# Patient Record
Sex: FEMALE | Race: WHITE | URBAN - METROPOLITAN AREA
[De-identification: names, ages, dates, MRNs, and addresses within clinical notes are randomized per-mention and may not be internally consistent; named-entity substitution may affect disease eponyms.]

---

## 2018-02-09 ENCOUNTER — HOSPITAL ENCOUNTER (OUTPATIENT)
Dept: PHYSICAL THERAPY | Age: 43
Discharge: HOME | End: 2018-02-09
Payer: COMMERCIAL

## 2018-02-09 PROCEDURE — 97161 PT EVAL LOW COMPLEX 20 MIN: CPT

## 2018-02-09 NOTE — PROGRESS NOTES
Ambulatory/Rehab Services H2 Model Falls Risk Assessment    Risk Factor Pts. ·   Confusion/Disorientation/Impulsivity  []    4 ·   Symptomatic Depression  []   2 ·   Altered Elimination  []   1 ·   Dizziness/Vertigo  []   1 ·   Gender (Male)  []   1 ·   Any administered antiepileptics (anticonvulsants):  []   2 ·   Any administered benzodiazepines:  []   1 ·   Visual Impairment (specify):  []   1 ·   Portable Oxygen Use  []   1 ·   Orthostatic ? BP  []   1 ·   History of Recent Falls (within 3 mos.)  []   5     Ability to Rise from Chair (choose one) Pts. ·   Ability to rise in a single movement  [x]   0 ·   Pushes up, successful in one attempt  []   1 ·   Multiple attempts, but successful  []   3 ·   Unable to rise without assistance  []   4   Total: (5 or greater = High Risk) 0     Falls Prevention Plan:   []                Physical Limitations to Exercise (specify):   []                Mobility Assistance Device (type):   []                Exercise/Equipment Adaptation (specify):    ©2010 [facility] [address] Patent #7568,600.  Federal Law prohibits the replication, distribution or use without written permission from [facility] [employer]

## 2018-02-09 NOTE — THERAPY EVALUATION
Aldona Closs  : 1975  Primary: Elise Yuan Of [facility]*  Secondary:  [facility]rapy Center at [facility], [address] [address], [address]  [phone]   [phone]          OUTPATIENT PHYSICAL THERAPY:Initial Assessment 2018    ICD-10: Treatment Diagnosis: Stress Incontinence (N39.3)   Precautions/Allergies:   Review of patient's allergies indicates no known allergies. Fall Risk Score: 0 (? 5 = High Risk)  MD Orders: Evaluate and treat MEDICAL/REFERRING DIAGNOSIS:  Stress incontinence [N39.3]   DATE OF ONSET:   REFERRING PHYSICIAN: Matty Kaur MD  RETURN PHYSICIAN APPOINTMENT: unknown     INITIAL ASSESSMENT:  Ms. Humza Roberts presents with significant weakness, poor endurance, and decreased coordination of PFmm likely contributing to her stress incontinence. She also demonstrates mild laxity of anterior vaginal wall, which may also be a possible contributor. She exhibited inability to isolate PFmm without accessory abdominal use, however this improved by end of session. Pt will benefit from physical therapy to address stated problems. Plan of care was discussed and agreed upon with patient and HEP was initiated. Thank you for the opportunity to work with this patient. PROBLEM LIST (Impacting functional limitations):  1. Decreased strength of pelvic floor which limits bladder control INTERVENTIONS PLANNED:  1. Neuromuscular re-education  2. Biofeedback as needed  3. HEP  4. Bladder retraining  5. Bladder education  6. Manual Therapy  7. Therapeutic Activites  8. Therapeutic Exercise/Strengthening   TREATMENT PLAN:  Effective Dates: 18 TO 18. Frequency/Duration: 1 time a week for 12 weeks  GOALS: (Goals have been discussed and agreed upon with patient.)  Short-Term Functional Goals: Time Frame: 4 weeks  1.  Patient will demonstrate independence with home exercise program.  2. Pt will demonstrate good isolation of PFmm during kegel for efficient strengthening without additional pressure on bladder. Discharge Goals: Time Frame: 12 weeks  1. Pt will score 7% on PFIQ7 for overall functional improvement. 2. Pt will demonstrate improvement in PFmm strength to score 3/5 on MMT and hold 10sec. 3. Pt will report ability to perform daily activities without use of panty liner for improved quality of life. 4. Pt will report ability to perform kickboxing class and run with only panty liner for decreased social anxiety. Rehabilitation Potential For Stated Goals: Good  Regarding Gretchen Humerickhouse's therapy, I certify that the treatment plan above will be carried out by a therapist or under their direction. Thank you for this referral,  Andressa Baxter, PT     Referring Physician Signature: Tono Dasilva MD              Date                    The information in this section was collected on 02/09/18  (except where otherwise noted). HISTORY:   History of Present Injury/Illness (Reason for Referral):  2005 after 2nd child getting progressively worse more consistent since 2007.  2 vaginal births - epiosiotomy with 1st large grade. Difficulty exercising etc.. Past Medical History/Comorbidities:   Ms. Jefferson Mendoza  has no past medical history on file. Ms. Jefferson Mendoza  has no past surgical history on file. Social History/Living Environment:     enjoys kickboxing and running. If running needs to wear size 2-3 pad  Prior Level of Function/Work/Activity:  . Previous Treatment Approaches:          none  Personal Factors:          Sex:  female        Age:  38 y.o. Current Medications:  No current outpatient prescriptions on file. Date Last Reviewed:  02/09/18   Voiding Patterns:  Patient voids every few hours during the day and 0 times during the night. Patient reports that she empties bladder fully. Patient uses pads for bladder protection; she changes pads 1 times per day.   Fluid Intake:  Patient drinks 36-60oz of water per day. She consumes 2 cups of caffeinated beverages per day. Patient does not limit fluid intake to control bladder. Bowel Habits:  Patient demonstrates normal bowel habits. Personal / Social History:  · Sexually active? YES:       Number of Personal Factors/Comorbidities that affect the Plan of Care: 1-2: MODERATE COMPLEXITY   EXAMINATION:   External Observation:   · Voluntary Contraction:not present  · Voluntary Relaxation: not present  · Involuntary Contraction: not present  · Involuntary Relaxation: not present  · Perineal Body Assessment: decreased size. Some evidence of prior tearing from childbirth  · Skin Integrity: normal  · Q-tip Test: no tenderness  · Vaginal Vault Size: within normal limits    Lacock PERFECT:  Via: vaginal canal        Strength: P: Power, E: Endurance, R: Repetitions, QF: Quick Flicks, TrA: Transverse Abdominus, T: Timing, DB: Diaphragmatic Breathing  P trace   E 2sec   R 6   QF 6   TrA normal   T    DB normal                 Palpation:   Right Left   Bulbocavernosus     Ischocavernosus     Superficial Transverse Perineal     Sphincter Urethrae     Compressor Urethra      Urethra-vaginalis     Deep Transverse Perineium     Obturator Internus     Iliococcygeus     Coccygeus     Pubococcygeus     Levator Ani     Adductor     Psoas Mildly tender Mildly tender   Ant. Abdominal wall             Prolapse:  · Tissue support test with bearing down (Grade 1: not visible at introitus; Grade 2: visible at introitus; Grade 3: tissue outside introitus):  · Anterior Wall = 2   · Posterior Wall = 1   · Apical = 1 but able to feel descent with bearing down       Body Structures Involved:  1. Nerves  2. Joints  3. Muscles  4. Ligaments Body Functions Affected:  1. Mental  2. Sensory/Pain  3. Genitourinary  4. Neuromusculoskeletal  5. Movement Related  6. Skin Related Activities and Participation Affected:  1. Learning and Applying Knowledge  2.  General Tasks and Demands  3. Mobility  4. Self Care  5. Interpersonal Interactions and Relationships   Number of elements that affect the Plan of Care: 1-2: LOW COMPLEXITY   CLINICAL PRESENTATION:   Presentation: Stable and uncomplicated: LOW COMPLEXITY   CLINICAL DECISION MAKING:   Outcome Measure: Tool Used: Pelvic Floor Impact Questionnaireshort form 7 (PFIQ-7)   Score:  Initial: 14.29%  · Bladder or Urine: 42.86  · Bowel or Rectum: 0  · Vagina or Pelvis: 0 Most Recent: X (Date: -- )  · Bladder or Urine: X  · Bowel or Rectum: X  · Vagina or Pelvis: X   Interpretation of Score: Each of the 7 sections is scored on a scale from 0-3; 0 representing \"Not at all\", 3 representing \"Quite a bit\". The mean value is taken from all the answered items, then multiplied by 100 to obtain the scale score, ranging from 0-100. This process is repeated for each column representing bowel, bladder, and pelvic pain. ? Self Care:     - CURRENT STATUS: CI - 1%-19% impaired, limited or restricted    - GOAL STATUS: CI - 1%-19% impaired, limited or restricted    - D/C STATUS:  ---------------To be determined---------------     Medical Necessity:   · Patient demonstrates good rehab potential due to higher previous functional level. Reason for Services/Other Comments:  · Patient continues to require skilled intervention due to ongoing goals noted above. Use of outcome tool(s) and clinical judgement create a POC that gives a: Clear prediction of patient's progress: LOW COMPLEXITY   TREATMENT:   (In addition to Assessment/Re-Assessment sessions the following treatments were rendered)  Pre-treatment Symptoms/Complaints:  See above  Pain: Initial:   Pain Intensity 1: 0  Post Session:  0     THERAPEUTIC EXERCISE: (  minutes):  Exercises per grid below to improve strength and coordination. Required minimal verbal and tactile cues to promote proper body mechanics and promote proper body breathing techniques.   Progressed resistance and repetitions as indicated. Date:   Date:   Date:     Activity/Exercise Parameters Parameters Parameters                                                (Used abbreviations: MET - muscle energy technique; SCS- Strain counter strain; CTM- Connective tissue mobilizations; CR- Contract/relax; SP- Sustained pressure, TrP- Trigger point release, IASTM- Instrument assisted soft tissue mobilizations, TDN- Trigger point dry needling). HEP Exercises:  Patient instructed in pelvic floor exercises listed below:  kegels 2-3sec hold 10x TID    [employer] Portal    The following educational topics were reviewed with patient:  Bladder health, tips to control urge, bladder diary, pelvic floor anatomy, how foods affect bladder, bladder retraining. Treatment/Session Assessment:  Pt reported good understanding of plan of care as well as exercises. All questions were answered and pt was invited to call with any further questions or issues  · Response to Treatment:  Good   · Compliance with Program/Exercises: Will assess as treatment progresses. · Recommendations/Intent for next treatment session: \"Next visit will focus on advancements to more challenging activities\".   Total Treatment Duration:  PT Patient Time In/Time Out  Time In: 1315  Time Out: Linda 7, PT

## 2018-02-21 ENCOUNTER — HOSPITAL ENCOUNTER (OUTPATIENT)
Dept: PHYSICAL THERAPY | Age: 43
Discharge: HOME | End: 2018-02-21
Payer: COMMERCIAL

## 2018-02-21 PROCEDURE — 97110 THERAPEUTIC EXERCISES: CPT

## 2018-02-21 NOTE — PROGRESS NOTES
Jt Rhoades  : 1975  Primary: Martell Sher Of [facility]*  Secondary:  Therapy Center at [facility], [address] [facility],[address], [address]  [phone]   [phone]          OUTPATIENT PHYSICAL THERAPY:Daily Note 2018    ICD-10: Treatment Diagnosis: Stress Incontinence (N39.3)   Precautions/Allergies:   Review of patient's allergies indicates no known allergies. Fall Risk Score: 0 (? 5 = High Risk)  MD Orders: Evaluate and treat MEDICAL/REFERRING DIAGNOSIS:  Stress incontinence [N39.3]   DATE OF ONSET:   REFERRING PHYSICIAN: Yvette Wetzel MD  RETURN PHYSICIAN APPOINTMENT: unknown     INITIAL ASSESSMENT:  Ms. Anne Saavedra presents with significant weakness, poor endurance, and decreased coordination of PFmm likely contributing to her stress incontinence. She also demonstrates mild laxity of anterior vaginal wall, which may also be a possible contributor. She exhibited inability to isolate PFmm without accessory abdominal use, however this improved by end of session. Pt will benefit from physical therapy to address stated problems. Plan of care was discussed and agreed upon with patient and HEP was initiated. Thank you for the opportunity to work with this patient. PROBLEM LIST (Impacting functional limitations):  1. Decreased strength of pelvic floor which limits bladder control INTERVENTIONS PLANNED:  1. Neuromuscular re-education  2. Biofeedback as needed  3. HEP  4. Bladder retraining  5. Bladder education  6. Manual Therapy  7. Therapeutic Activites  8. Therapeutic Exercise/Strengthening   TREATMENT PLAN:  Effective Dates: 18 TO 18. Frequency/Duration: 1 time a week for 12 weeks  GOALS: (Goals have been discussed and agreed upon with patient.)  Short-Term Functional Goals: Time Frame: 4 weeks  1.  Patient will demonstrate independence with home exercise program.  2. Pt will demonstrate good isolation of PFmm during kegel for efficient strengthening without additional pressure on bladder. Discharge Goals: Time Frame: 12 weeks  1. Pt will score 7% on PFIQ7 for overall functional improvement. 2. Pt will demonstrate improvement in PFmm strength to score 3/5 on MMT and hold 10sec. 3. Pt will report ability to perform daily activities without use of panty liner for improved quality of life. 4. Pt will report ability to perform kickboxing class and run with only panty liner for decreased social anxiety. Rehabilitation Potential For Stated Goals: Good  Regarding Gretchen Humerickhouse's therapy, I certify that the treatment plan above will be carried out by a therapist or under their direction. Thank you for this referral,  Oscar Crespo PT     Referring Physician Signature: Yancy Pena MD              Date                    The information in this section was collected on 02/21/18  (except where otherwise noted). HISTORY:   History of Present Injury/Illness (Reason for Referral):  2005 after 2nd child getting progressively worse more consistent since 2007.  2 vaginal births - epiosiotomy with 1st large grade. Difficulty exercising etc.. Past Medical History/Comorbidities:   Ms. Roque Haider  has no past medical history on file. Ms. Roque Haider  has no past surgical history on file. Social History/Living Environment:     enjoys kickboxing and running. If running needs to wear size 2-3 pad  Prior Level of Function/Work/Activity:  . Previous Treatment Approaches:          none  Personal Factors:          Sex:  female        Age:  38 y.o. Current Medications:  No current outpatient prescriptions on file. Date Last Reviewed:  02/21/18   Voiding Patterns:  Patient voids every few hours during the day and 0 times during the night. Patient reports that she empties bladder fully. Patient uses pads for bladder protection; she changes pads 1 times per day.   Fluid Intake:  Patient drinks 36-60oz of water per day. She consumes 2 cups of caffeinated beverages per day. Patient does not limit fluid intake to control bladder. Bowel Habits:  Patient demonstrates normal bowel habits. Personal / Social History:  · Sexually active? YES:       Number of Personal Factors/Comorbidities that affect the Plan of Care: 1-2: MODERATE COMPLEXITY   EXAMINATION:   External Observation:   · Voluntary Contraction:not present  · Voluntary Relaxation: not present  · Involuntary Contraction: not present  · Involuntary Relaxation: not present  · Perineal Body Assessment: decreased size. Some evidence of prior tearing from childbirth  · Skin Integrity: normal  · Q-tip Test: no tenderness  · Vaginal Vault Size: within normal limits    Lacock PERFECT:  Via: vaginal canal        Strength: P: Power, E: Endurance, R: Repetitions, QF: Quick Flicks, TrA: Transverse Abdominus, T: Timing, DB: Diaphragmatic Breathing  P trace   E 2sec   R 6   QF 6   TrA normal   T    DB normal                 Palpation:   Right Left   Bulbocavernosus     Ischocavernosus     Superficial Transverse Perineal     Sphincter Urethrae     Compressor Urethra      Urethra-vaginalis     Deep Transverse Perineium     Obturator Internus     Iliococcygeus     Coccygeus     Pubococcygeus     Levator Ani     Adductor     Psoas Mildly tender Mildly tender   Ant. Abdominal wall             Prolapse:  · Tissue support test with bearing down (Grade 1: not visible at introitus; Grade 2: visible at introitus; Grade 3: tissue outside introitus):  · Anterior Wall = 2   · Posterior Wall = 1   · Apical = 1 but able to feel descent with bearing down       Body Structures Involved:  1. Nerves  2. Joints  3. Muscles  4. Ligaments Body Functions Affected:  1. Mental  2. Sensory/Pain  3. Genitourinary  4. Neuromusculoskeletal  5. Movement Related  6. Skin Related Activities and Participation Affected:  1. Learning and Applying Knowledge  2.  General Tasks and Demands  3. Mobility  4. Self Care  5. Interpersonal Interactions and Relationships   Number of elements that affect the Plan of Care: 1-2: LOW COMPLEXITY   CLINICAL PRESENTATION:   Presentation: Stable and uncomplicated: LOW COMPLEXITY   CLINICAL DECISION MAKING:   Outcome Measure: Tool Used: Pelvic Floor Impact Questionnaire--short form 7 (PFIQ-7)   Score:  Initial: 14.29%  · Bladder or Urine: 42.86  · Bowel or Rectum: 0  · Vagina or Pelvis: 0 Most Recent: X (Date: -- )  · Bladder or Urine: X  · Bowel or Rectum: X  · Vagina or Pelvis: X   Interpretation of Score: Each of the 7 sections is scored on a scale from 0-3; 0 representing \"Not at all\", 3 representing \"Quite a bit\". The mean value is taken from all the answered items, then multiplied by 100 to obtain the scale score, ranging from 0-100. This process is repeated for each column representing bowel, bladder, and pelvic pain. ? Self Care:     - CURRENT STATUS: CI - 1%-19% impaired, limited or restricted    - GOAL STATUS: CI - 1%-19% impaired, limited or restricted    - D/C STATUS:  ---------------To be determined---------------     Medical Necessity:   · Patient demonstrates good rehab potential due to higher previous functional level. Reason for Services/Other Comments:  · Patient continues to require skilled intervention due to ongoing goals noted above. Use of outcome tool(s) and clinical judgement create a POC that gives a: Clear prediction of patient's progress: LOW COMPLEXITY   TREATMENT:   (In addition to Assessment/Re-Assessment sessions the following treatments were rendered)  Pre-treatment Symptoms/Complaints:  States she has been doing kegels TID for the most part and is feeling stronger/able to hold longer and better. Pain: Initial:   Pain Intensity 1: 0  Post Session:  0     THERAPEUTIC EXERCISE: (55  minutes):  Exercises per grid below to improve strength and coordination.   Required minimal verbal and tactile cues to promote proper body mechanics and promote proper body breathing techniques. Progressed resistance and repetitions as indicated. Date:  2/21/18 Date:   Date:     Activity/Exercise Parameters Parameters Parameters   Isometric PFmm biofeedback     Bridge with ball squeeze 15x     Bridge with isometric ER      Sidelying clams      Isometric TA w kegel      squat 10x     NMES 10'      biofeedback utilized to assess resting tone, 5/10, 2/4  NMES utilized for improvement in muscle function, recruitment, and strength:  50Hz 5/10 x10'    (Used abbreviations: MET - muscle energy technique; SCS- Strain counter strain; CTM- Connective tissue mobilizations; CR- Contract/relax; SP- Sustained pressure, TrP- Trigger point release, IASTM- Instrument assisted soft tissue mobilizations, TDN- Trigger point dry needling). HEP Exercises:  Patient instructed in pelvic floor exercises listed below:  kegels 2-3sec hold 10x TID    [facility] Portal    The following educational topics were reviewed with patient:  Bladder health, tips to control urge, bladder diary, pelvic floor anatomy, how foods affect bladder, bladder retraining. Treatment/Session Assessment: mildly higher than normal resting tone noted with biofeedback. Checked against external electrodes and level was lower (between 2-3uV). Possibly pressure from anterior wall laxity. Good response to ex's and treatment today. Progress large muscle group ex's next visit  · Response to Treatment:  Good   · Compliance with Program/Exercises: Will assess as treatment progresses. · Recommendations/Intent for next treatment session: \"Next visit will focus on advancements to more challenging activities\".   Total Treatment Duration:  PT Patient Time In/Time Out  Time In: 1330  Time Out: [address] [place]re

## 2018-02-28 ENCOUNTER — HOSPITAL ENCOUNTER (OUTPATIENT)
Dept: PHYSICAL THERAPY | Age: 43
Discharge: HOME | End: 2018-02-28
Payer: COMMERCIAL

## 2018-02-28 NOTE — PROGRESS NOTES
Therapy Center at [place] [address]  [address] [facility], [address] [place] [address], [facility] [address]  Phone: [phone]   Fax: [phone]    Pt cancelled today's physical therapy appointment stating she is sick. Plan to follow up at next scheduled visit.     Iesha Perry, MPT

## 2018-03-07 ENCOUNTER — HOSPITAL ENCOUNTER (OUTPATIENT)
Dept: PHYSICAL THERAPY | Age: 43
Discharge: HOME | End: 2018-03-07
Payer: COMMERCIAL

## 2018-03-07 PROCEDURE — 97110 THERAPEUTIC EXERCISES: CPT

## 2018-03-07 NOTE — PROGRESS NOTES
Collis Severs  : 1975  Primary: [facility]*  Secondary:  [facility] [place] [id number] [facility], [address] [place] [address],[address], [facility] [address] [facility].  [phone]   [phone]          OUTPATIENT PHYSICAL THERAPY:Daily Note 3/7/2018    ICD-10: Treatment Diagnosis: Stress Incontinence (N39.3)   Precautions/Allergies:   Review of patient's allergies indicates no known allergies. Fall Risk Score: 0 (? 5 = High Risk)  MD Orders: Evaluate and treat MEDICAL/REFERRING DIAGNOSIS:  Stress incontinence [N39.3]   DATE OF ONSET:   REFERRING PHYSICIAN: Anamaria Cummings MD  RETURN PHYSICIAN APPOINTMENT: unknown     INITIAL ASSESSMENT:  Ms. Cleo Helms presents with significant weakness, poor endurance, and decreased coordination of PFmm likely contributing to her stress incontinence. She also demonstrates mild laxity of anterior vaginal wall, which may also be a possible contributor. She exhibited inability to isolate PFmm without accessory abdominal use, however this improved by end of session. Pt will benefit from physical therapy to address stated problems. Plan of care was discussed and agreed upon with patient and HEP was initiated. Thank you for the opportunity to work with this patient. PROBLEM LIST (Impacting functional limitations):  1. Decreased strength of pelvic floor which limits bladder control INTERVENTIONS PLANNED:  1. Neuromuscular re-education  2. Biofeedback as needed  3. HEP  4. Bladder retraining  5. Bladder education  6. Manual Therapy  7. Therapeutic Activites  8. Therapeutic Exercise/Strengthening   TREATMENT PLAN:  Effective Dates: 18 TO 18. Frequency/Duration: 1 time a week for 12 weeks  GOALS: (Goals have been discussed and agreed upon with patient.)  Short-Term Functional Goals: Time Frame: 4 weeks  1.  Patient will demonstrate independence with home exercise program.  2. Pt will demonstrate good isolation of PFmm during kegel for efficient strengthening without additional pressure on bladder. Discharge Goals: Time Frame: 12 weeks  1. Pt will score 7% on PFIQ7 for overall functional improvement. 2. Pt will demonstrate improvement in PFmm strength to score 3/5 on MMT and hold 10sec. 3. Pt will report ability to perform daily activities without use of panty liner for improved quality of life. 4. Pt will report ability to perform kickboxing class and run with only panty liner for decreased social anxiety. Rehabilitation Potential For Stated Goals: Good  Regarding Gretchen Humerickhouse's therapy, I certify that the treatment plan above will be carried out by a therapist or under their direction. Thank you for this referral,  Andressa Baxter PT     Referring Physician Signature: Tono Dasilva MD              Date                    The information in this section was collected on 03/07/18  (except where otherwise noted). HISTORY:   History of Present Injury/Illness (Reason for Referral):  2005 after 2nd child getting progressively worse more consistent since 2007.  2 vaginal births - epiosiotomy with 1st large grade. Difficulty exercising etc.. Past Medical History/Comorbidities:   Ms. Jefferson Mendoza  has no past medical history on file. Ms. Jefferson Mendoza  has no past surgical history on file. Social History/Living Environment:     enjoys kickboxing and running. If running needs to wear size 2-3 pad  Prior Level of Function/Work/Activity:  . Previous Treatment Approaches:          none  Personal Factors:          Sex:  female        Age:  38 y.o. Current Medications:  No current outpatient prescriptions on file. Date Last Reviewed:  03/07/18   Voiding Patterns:  Patient voids every few hours during the day and 0 times during the night. Patient reports that she empties bladder fully. Patient uses pads for bladder protection; she changes pads 1 times per day.   Fluid Intake:  Patient drinks 36-60oz of water per day. She consumes 2 cups of caffeinated beverages per day. Patient does not limit fluid intake to control bladder. Bowel Habits:  Patient demonstrates normal bowel habits. Personal / Social History:  · Sexually active? YES:       Number of Personal Factors/Comorbidities that affect the Plan of Care: 1-2: MODERATE COMPLEXITY   EXAMINATION:   External Observation:   · Voluntary Contraction:not present  · Voluntary Relaxation: not present  · Involuntary Contraction: not present  · Involuntary Relaxation: not present  · Perineal Body Assessment: decreased size. Some evidence of prior tearing from childbirth  · Skin Integrity: normal  · Q-tip Test: no tenderness  · Vaginal Vault Size: within normal limits    Lacock PERFECT:  Via: vaginal canal        Strength: P: Power, E: Endurance, R: Repetitions, QF: Quick Flicks, TrA: Transverse Abdominus, T: Timing, DB: Diaphragmatic Breathing  P trace   E 2sec   R 6   QF 6   TrA normal   T    DB normal                 Palpation:   Right Left   Bulbocavernosus     Ischocavernosus     Superficial Transverse Perineal     Sphincter Urethrae     Compressor Urethra      Urethra-vaginalis     Deep Transverse Perineium     Obturator Internus     Iliococcygeus     Coccygeus     Pubococcygeus     Levator Ani     Adductor     Psoas Mildly tender Mildly tender   Ant. Abdominal wall             Prolapse:  · Tissue support test with bearing down (Grade 1: not visible at introitus; Grade 2: visible at introitus; Grade 3: tissue outside introitus):  · Anterior Wall = 2   · Posterior Wall = 1   · Apical = 1 but able to feel descent with bearing down       Body Structures Involved:  1. Nerves  2. Joints  3. Muscles  4. Ligaments Body Functions Affected:  1. Mental  2. Sensory/Pain  3. Genitourinary  4. Neuromusculoskeletal  5. Movement Related  6. Skin Related Activities and Participation Affected:  1. Learning and Applying Knowledge  2.  General Tasks and Demands  3. Mobility  4. Self Care  5. Interpersonal Interactions and Relationships   Number of elements that affect the Plan of Care: 1-2: LOW COMPLEXITY   CLINICAL PRESENTATION:   Presentation: Stable and uncomplicated: LOW COMPLEXITY   CLINICAL DECISION MAKING:   Outcome Measure: Tool Used: Pelvic Floor Impact Questionnaire--short form 7 (PFIQ-7)   Score:  Initial: 14.29%  · Bladder or Urine: 42.86  · Bowel or Rectum: 0  · Vagina or Pelvis: 0 Most Recent: X (Date: -- )  · Bladder or Urine: X  · Bowel or Rectum: X  · Vagina or Pelvis: X   Interpretation of Score: Each of the 7 sections is scored on a scale from 0-3; 0 representing \"Not at all\", 3 representing \"Quite a bit\". The mean value is taken from all the answered items, then multiplied by 100 to obtain the scale score, ranging from 0-100. This process is repeated for each column representing bowel, bladder, and pelvic pain. ? Self Care:     - CURRENT STATUS: CI - 1%-19% impaired, limited or restricted    - GOAL STATUS: CI - 1%-19% impaired, limited or restricted    - D/C STATUS:  ---------------To be determined---------------     Medical Necessity:   · Patient demonstrates good rehab potential due to higher previous functional level. Reason for Services/Other Comments:  · Patient continues to require skilled intervention due to ongoing goals noted above. Use of outcome tool(s) and clinical judgement create a POC that gives a: Clear prediction of patient's progress: LOW COMPLEXITY   TREATMENT:   (In addition to Assessment/Re-Assessment sessions the following treatments were rendered)  Pre-treatment Symptoms/Complaints: has been out w the flu but trying to keep up with ex's. Feels she is able to hole supine kegel 5-6 sec, sitting 3 sec, and standing 1 sec. .    Pain: Initial:   Pain Intensity 1: 0  Post Session:  0     THERAPEUTIC EXERCISE: (55  minutes):  Exercises per grid below to improve strength and coordination.   Required minimal verbal and tactile cues to promote proper body mechanics and promote proper body breathing techniques. Progressed resistance and repetitions as indicated. Date:  2/21/18 Date:  3/7/18 Date:     Activity/Exercise Parameters Parameters Parameters   Isometric PFmm biofeedback     Bridge with ball squeeze 15x 10x    Bridge with isometric ER  10x    Sidelying clams  GTB 10x    Isometric TA w kegel  (BP cuff)  12' (TA, march, heel slide)    squat 10x     NMES 10' 10'     biofeedback utilized to assess resting tone, 5/10, 2/4  NMES utilized for improvement in muscle function, recruitment, and strength:  50Hz 5/10 x10'    (Used abbreviations: MET - muscle energy technique; SCS- Strain counter strain; CTM- Connective tissue mobilizations; CR- Contract/relax; SP- Sustained pressure, TrP- Trigger point release, IASTM- Instrument assisted soft tissue mobilizations, TDN- Trigger point dry needling). HEP Exercises:  Patient instructed in pelvic floor exercises listed below:  kegels 2-3sec hold 10x TID    [employer] Portal    The following educational topics were reviewed with patient:  Bladder health, tips to control urge, bladder diary, pelvic floor anatomy, how foods affect bladder, bladder retraining. · Treatment/Session Assessment:  · Response to Treatment:  Good tolerance to supine ex's. Endurance not up yet for upright activities. Would benefit from home NMES unit. · Compliance with Program/Exercises: Will assess as treatment progresses. · Recommendations/Intent for next treatment session: \"Next visit will focus on advancements to more challenging activities\".   Total Treatment Duration:  PT Patient Time In/Time Out  Time In: 1330  Time Out: [address] Evie Montilla

## 2018-03-14 ENCOUNTER — HOSPITAL ENCOUNTER (OUTPATIENT)
Dept: PHYSICAL THERAPY | Age: 43
Discharge: HOME | End: 2018-03-14
Payer: COMMERCIAL

## 2018-03-14 PROCEDURE — 97110 THERAPEUTIC EXERCISES: CPT

## 2018-03-14 NOTE — PROGRESS NOTES
Hayley Standing  : 1975  Primary: [facility] [place] [facility]*  Secondary:  [facility] [place] [address] [facility], [address] [place] [address],[address], [address] [facility]  [phone]   [phone]          OUTPATIENT PHYSICAL THERAPY:Daily Note 3/14/2018    ICD-10: Treatment Diagnosis: Stress Incontinence (N39.3)   Precautions/Allergies:   Review of patient's allergies indicates no known allergies. Fall Risk Score: 0 (? 5 = High Risk)  MD Orders: Evaluate and treat MEDICAL/REFERRING DIAGNOSIS:  Stress incontinence [N39.3]   DATE OF ONSET:   REFERRING PHYSICIAN: Rommel Estrada MD  RETURN PHYSICIAN APPOINTMENT: unknown     INITIAL ASSESSMENT:  Ms. Valentine Sandy presents with significant weakness, poor endurance, and decreased coordination of PFmm likely contributing to her stress incontinence. She also demonstrates mild laxity of anterior vaginal wall, which may also be a possible contributor. She exhibited inability to isolate PFmm without accessory abdominal use, however this improved by end of session. Pt will benefit from physical therapy to address stated problems. Plan of care was discussed and agreed upon with patient and HEP was initiated. Thank you for the opportunity to work with this patient. PROBLEM LIST (Impacting functional limitations):  1. Decreased strength of pelvic floor which limits bladder control INTERVENTIONS PLANNED:  1. Neuromuscular re-education  2. Biofeedback as needed  3. HEP  4. Bladder retraining  5. Bladder education  6. Manual Therapy  7. Therapeutic Activites  8. Therapeutic Exercise/Strengthening   TREATMENT PLAN:  Effective Dates: 18 TO 18. Frequency/Duration: 1 time a week for 12 weeks  GOALS: (Goals have been discussed and agreed upon with patient.)  Short-Term Functional Goals: Time Frame: 4 weeks  1.  Patient will demonstrate independence with home exercise program.  2. Pt will demonstrate good isolation of PFmm during kegel for efficient strengthening without additional pressure on bladder. Discharge Goals: Time Frame: 12 weeks  1. Pt will score 7% on PFIQ7 for overall functional improvement. 2. Pt will demonstrate improvement in PFmm strength to score 3/5 on MMT and hold 10sec. 3. Pt will report ability to perform daily activities without use of panty liner for improved quality of life. 4. Pt will report ability to perform kickboxing class and run with only panty liner for decreased social anxiety. Rehabilitation Potential For Stated Goals: Good  Regarding Gretchen Humerickhouse's therapy, I certify that the treatment plan above will be carried out by a therapist or under their direction. Thank you for this referral,  Suzan Cervantes PT     Referring Physician Signature: Nadege Lu MD              Date                    The information in this section was collected on 03/14/18  (except where otherwise noted). HISTORY:   History of Present Injury/Illness (Reason for Referral):  2005 after 2nd child getting progressively worse more consistent since 2007.  2 vaginal births - epiosiotomy with 1st large grade. Difficulty exercising etc.. Past Medical History/Comorbidities:   Ms. Silvia Grullon  has no past medical history on file. Ms. Silvia Grullon  has no past surgical history on file. Social History/Living Environment:     enjoys kickboxing and running. If running needs to wear size 2-3 pad  Prior Level of Function/Work/Activity:  . Previous Treatment Approaches:          none  Personal Factors:          Sex:  female        Age:  38 y.o. Current Medications:  No current outpatient prescriptions on file. Date Last Reviewed:  03/14/18   Voiding Patterns:  Patient voids every few hours during the day and 0 times during the night. Patient reports that she empties bladder fully. Patient uses pads for bladder protection; she changes pads 1 times per day.   Fluid Intake:  Patient drinks 36-60oz of water per day. She consumes 2 cups of caffeinated beverages per day. Patient does not limit fluid intake to control bladder. Bowel Habits:  Patient demonstrates normal bowel habits. Personal / Social History:  · Sexually active? YES:       Number of Personal Factors/Comorbidities that affect the Plan of Care: 1-2: MODERATE COMPLEXITY   EXAMINATION:   External Observation:   · Voluntary Contraction:not present  · Voluntary Relaxation: not present  · Involuntary Contraction: not present  · Involuntary Relaxation: not present  · Perineal Body Assessment: decreased size. Some evidence of prior tearing from childbirth  · Skin Integrity: normal  · Q-tip Test: no tenderness  · Vaginal Vault Size: within normal limits    Lacock PERFECT:  Via: vaginal canal        Strength: P: Power, E: Endurance, R: Repetitions, QF: Quick Flicks, TrA: Transverse Abdominus, T: Timing, DB: Diaphragmatic Breathing  P trace   E 2sec   R 6   QF 6   TrA normal   T    DB normal                 Palpation:   Right Left   Bulbocavernosus     Ischocavernosus     Superficial Transverse Perineal     Sphincter Urethrae     Compressor Urethra      Urethra-vaginalis     Deep Transverse Perineium     Obturator Internus     Iliococcygeus     Coccygeus     Pubococcygeus     Levator Ani     Adductor     Psoas Mildly tender Mildly tender   Ant. Abdominal wall             Prolapse:  · Tissue support test with bearing down (Grade 1: not visible at introitus; Grade 2: visible at introitus; Grade 3: tissue outside introitus):  · Anterior Wall = 2   · Posterior Wall = 1   · Apical = 1 but able to feel descent with bearing down       Body Structures Involved:  1. Nerves  2. Joints  3. Muscles  4. Ligaments Body Functions Affected:  1. Mental  2. Sensory/Pain  3. Genitourinary  4. Neuromusculoskeletal  5. Movement Related  6. Skin Related Activities and Participation Affected:  1. Learning and Applying Knowledge  2.  General Tasks and Demands  3. Mobility  4. Self Care  5. Interpersonal Interactions and Relationships   Number of elements that affect the Plan of Care: 1-2: LOW COMPLEXITY   CLINICAL PRESENTATION:   Presentation: Stable and uncomplicated: LOW COMPLEXITY   CLINICAL DECISION MAKING:   Outcome Measure: Tool Used: Pelvic Floor Impact Questionnaire--short form 7 (PFIQ-7)   Score:  Initial: 14.29%  · Bladder or Urine: 42.86  · Bowel or Rectum: 0  · Vagina or Pelvis: 0 Most Recent: X (Date: -- )  · Bladder or Urine: X  · Bowel or Rectum: X  · Vagina or Pelvis: X   Interpretation of Score: Each of the 7 sections is scored on a scale from 0-3; 0 representing \"Not at all\", 3 representing \"Quite a bit\". The mean value is taken from all the answered items, then multiplied by 100 to obtain the scale score, ranging from 0-100. This process is repeated for each column representing bowel, bladder, and pelvic pain. ? Self Care:     - CURRENT STATUS: CI - 1%-19% impaired, limited or restricted    - GOAL STATUS: CI - 1%-19% impaired, limited or restricted    - D/C STATUS:  ---------------To be determined---------------     Medical Necessity:   · Patient demonstrates good rehab potential due to higher previous functional level. Reason for Services/Other Comments:  · Patient continues to require skilled intervention due to ongoing goals noted above. Use of outcome tool(s) and clinical judgement create a POC that gives a: Clear prediction of patient's progress: LOW COMPLEXITY   TREATMENT:   (In addition to Assessment/Re-Assessment sessions the following treatments were rendered)  Pre-treatment Symptoms/Complaints: holding time is about the same. Feels she is able to hole supine kegel 5-6 sec, sitting 3 sec, and standing 1 sec. Lizandro Hutchins No new c/o    Pain: Initial:   Pain Intensity 1: 0  Post Session:  0     THERAPEUTIC EXERCISE: (55  minutes):  Exercises per grid below to improve strength and coordination.   Required minimal verbal and tactile cues to promote proper body mechanics and promote proper body breathing techniques. Progressed resistance and repetitions as indicated. Date:  3/7/18 Date:  3/14/18   Activity/Exercise Parameters Parameters   Isometric PFmm     Bridge with ball squeeze 10x    Bridge with isometric ER 10x    Sidelying clams GTB 10x    Isometric TA w kegel  (BP cuff) 12' (TA, march, heel slide) No BP cuff 10'    Kegel/TA/___  Ppt, Head lifts, marches, leg lifts, ER, sidelying abd   NMES 10' 10'    biofeedback utilized to assess resting tone, 5/10, 2/4  NMES utilized for improvement in muscle function, recruitment, and strength:  50Hz 5/10 x10'    (Used abbreviations: MET - muscle energy technique; SCS- Strain counter strain; CTM- Connective tissue mobilizations; CR- Contract/relax; SP- Sustained pressure, TrP- Trigger point release, IASTM- Instrument assisted soft tissue mobilizations, TDN- Trigger point dry needling). HEP Exercises:  Patient instructed in pelvic floor exercises listed below:  kegels 2-3sec hold 10x TID    [employer] Portal    The following educational topics were reviewed with patient:  Bladder health, tips to control urge, bladder diary, pelvic floor anatomy, how foods affect bladder, bladder retraining. · Treatment/Session Assessment:  · Response to Treatment:  Focused today's tx on co-contraction of PFmm and abd with ex's and motions. Much difficulty all motions initially but improved with practice. · Compliance with Program/Exercises: Will assess as treatment progresses. · Recommendations/Intent for next treatment session: \"Next visit will focus on advancements to more challenging activities\".   Total Treatment Duration:  PT Patient Time In/Time Out  Time In: 1230  Time Out: Paresh Oneill [address]

## 2018-03-21 ENCOUNTER — HOSPITAL ENCOUNTER (OUTPATIENT)
Dept: PHYSICAL THERAPY | Age: 43
Discharge: HOME | End: 2018-03-21
Payer: COMMERCIAL

## 2018-03-21 NOTE — PROGRESS NOTES
[facility] [place] [address]  [address] [facility], [facility] [address], [address]  Phone: [phone]   Fax: [phone]    Pt cancelled today's physical therapy appointment. Plan to follow up at next scheduled visit.     Sergio Lerma, MPT

## 2018-04-24 ENCOUNTER — HOSPITAL ENCOUNTER (OUTPATIENT)
Dept: PHYSICAL THERAPY | Age: 43
Discharge: HOME | End: 2018-04-24

## 2018-04-24 NOTE — PROGRESS NOTES
[facility] [address], [address] [place] [address],[address] [place], [address]    Phone: [phone]   Fax: [phone]    Pt cancelled today's physical therapy appointment reporting a work emergency. Plan to follow up at next scheduled visit.     Yvette Epperson, MPT